# Patient Record
Sex: MALE | Race: BLACK OR AFRICAN AMERICAN | NOT HISPANIC OR LATINO | URBAN - METROPOLITAN AREA
[De-identification: names, ages, dates, MRNs, and addresses within clinical notes are randomized per-mention and may not be internally consistent; named-entity substitution may affect disease eponyms.]

---

## 2017-04-12 ENCOUNTER — EMERGENCY (EMERGENCY)
Facility: HOSPITAL | Age: 37
LOS: 1 days | Discharge: HOME | End: 2017-04-12
Attending: EMERGENCY MEDICINE | Admitting: EMERGENCY MEDICINE
Payer: COMMERCIAL

## 2017-04-12 VITALS
SYSTOLIC BLOOD PRESSURE: 140 MMHG | DIASTOLIC BLOOD PRESSURE: 79 MMHG | HEART RATE: 80 BPM | OXYGEN SATURATION: 100 % | RESPIRATION RATE: 18 BRPM

## 2017-04-12 VITALS
TEMPERATURE: 98 F | RESPIRATION RATE: 16 BRPM | SYSTOLIC BLOOD PRESSURE: 134 MMHG | DIASTOLIC BLOOD PRESSURE: 68 MMHG | OXYGEN SATURATION: 100 % | HEART RATE: 70 BPM

## 2017-04-12 DIAGNOSIS — S01.81XA LACERATION WITHOUT FOREIGN BODY OF OTHER PART OF HEAD, INITIAL ENCOUNTER: ICD-10-CM

## 2017-04-12 DIAGNOSIS — Z23 ENCOUNTER FOR IMMUNIZATION: ICD-10-CM
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
## 2017-04-12 DIAGNOSIS — S02.80XA FRACTURE OF OTHER SPECIFIED SKULL AND FACIAL BONES, UNSPECIFIED SIDE, INITIAL ENCOUNTER FOR CLOSED FRACTURE: ICD-10-CM

## 2017-04-12 DIAGNOSIS — R51 HEADACHE: ICD-10-CM
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
## 2017-04-12 LAB
ALBUMIN SERPL ELPH-MCNC: 4.6 G/DL — SIGNIFICANT CHANGE UP (ref 3.3–5)
ALP SERPL-CCNC: 51 U/L — SIGNIFICANT CHANGE UP (ref 40–120)
ALT FLD-CCNC: 18 U/L RC — SIGNIFICANT CHANGE UP (ref 10–45)
ANION GAP SERPL CALC-SCNC: 15 MMOL/L — SIGNIFICANT CHANGE UP (ref 5–17)
APTT BLD: 30.5 SEC — SIGNIFICANT CHANGE UP (ref 27.5–37.4)
AST SERPL-CCNC: 21 U/L — SIGNIFICANT CHANGE UP (ref 10–40)
BASOPHILS # BLD AUTO: 0 K/UL — SIGNIFICANT CHANGE UP (ref 0–0.2)
BASOPHILS NFR BLD AUTO: 0.9 % — SIGNIFICANT CHANGE UP (ref 0–2)
BILIRUB SERPL-MCNC: 2 MG/DL — HIGH (ref 0.2–1.2)
BLD GP AB SCN SERPL QL: NEGATIVE — SIGNIFICANT CHANGE UP
BUN SERPL-MCNC: 13 MG/DL — SIGNIFICANT CHANGE UP (ref 7–23)
CALCIUM SERPL-MCNC: 9.2 MG/DL — SIGNIFICANT CHANGE UP (ref 8.4–10.5)
CHLORIDE SERPL-SCNC: 99 MMOL/L — SIGNIFICANT CHANGE UP (ref 96–108)
CK SERPL-CCNC: 150 U/L — SIGNIFICANT CHANGE UP (ref 30–200)
CO2 SERPL-SCNC: 26 MMOL/L — SIGNIFICANT CHANGE UP (ref 22–31)
CREAT SERPL-MCNC: 1 MG/DL — SIGNIFICANT CHANGE UP (ref 0.5–1.3)
EOSINOPHIL # BLD AUTO: 0 K/UL — SIGNIFICANT CHANGE UP (ref 0–0.5)
EOSINOPHIL NFR BLD AUTO: 0.7 % — SIGNIFICANT CHANGE UP (ref 0–6)
ETHANOL SERPL-MCNC: SIGNIFICANT CHANGE UP MG/DL (ref 0–10)
GAS PNL BLDV: SIGNIFICANT CHANGE UP
GLUCOSE SERPL-MCNC: 137 MG/DL — HIGH (ref 70–99)
HCT VFR BLD CALC: 48.7 % — SIGNIFICANT CHANGE UP (ref 39–50)
HGB BLD-MCNC: 16.2 G/DL — SIGNIFICANT CHANGE UP (ref 13–17)
INR BLD: 1.2 RATIO — HIGH (ref 0.88–1.16)
LIDOCAIN IGE QN: 26 U/L — SIGNIFICANT CHANGE UP (ref 7–60)
LYMPHOCYTES # BLD AUTO: 1.6 K/UL — SIGNIFICANT CHANGE UP (ref 1–3.3)
LYMPHOCYTES # BLD AUTO: 29.3 % — SIGNIFICANT CHANGE UP (ref 13–44)
MCHC RBC-ENTMCNC: 32 PG — SIGNIFICANT CHANGE UP (ref 27–34)
MCHC RBC-ENTMCNC: 33.3 GM/DL — SIGNIFICANT CHANGE UP (ref 32–36)
MCV RBC AUTO: 96 FL — SIGNIFICANT CHANGE UP (ref 80–100)
MONOCYTES # BLD AUTO: 0.2 K/UL — SIGNIFICANT CHANGE UP (ref 0–0.9)
MONOCYTES NFR BLD AUTO: 4.2 % — SIGNIFICANT CHANGE UP (ref 2–14)
NEUTROPHILS # BLD AUTO: 3.6 K/UL — SIGNIFICANT CHANGE UP (ref 1.8–7.4)
NEUTROPHILS NFR BLD AUTO: 65 % — SIGNIFICANT CHANGE UP (ref 43–77)
PLATELET # BLD AUTO: 188 K/UL — SIGNIFICANT CHANGE UP (ref 150–400)
POTASSIUM SERPL-MCNC: 3.8 MMOL/L — SIGNIFICANT CHANGE UP (ref 3.5–5.3)
POTASSIUM SERPL-SCNC: 3.8 MMOL/L — SIGNIFICANT CHANGE UP (ref 3.5–5.3)
PROT SERPL-MCNC: 7.3 G/DL — SIGNIFICANT CHANGE UP (ref 6–8.3)
PROTHROM AB SERPL-ACNC: 13.1 SEC — HIGH (ref 9.8–12.7)
RBC # BLD: 5.08 M/UL — SIGNIFICANT CHANGE UP (ref 4.2–5.8)
RBC # FLD: 11 % — SIGNIFICANT CHANGE UP (ref 10.3–14.5)
RH IG SCN BLD-IMP: NEGATIVE — SIGNIFICANT CHANGE UP
RH IG SCN BLD-IMP: POSITIVE — SIGNIFICANT CHANGE UP
RH IG SCN BLD-IMP: POSITIVE — SIGNIFICANT CHANGE UP
SODIUM SERPL-SCNC: 140 MMOL/L — SIGNIFICANT CHANGE UP (ref 135–145)
WBC # BLD: 5.5 K/UL — SIGNIFICANT CHANGE UP (ref 3.8–10.5)
WBC # FLD AUTO: 5.5 K/UL — SIGNIFICANT CHANGE UP (ref 3.8–10.5)

## 2017-04-12 PROCEDURE — 83690 ASSAY OF LIPASE: CPT

## 2017-04-12 PROCEDURE — 90471 IMMUNIZATION ADMIN: CPT

## 2017-04-12 PROCEDURE — 96374 THER/PROPH/DIAG INJ IV PUSH: CPT | Mod: XU

## 2017-04-12 PROCEDURE — 82330 ASSAY OF CALCIUM: CPT

## 2017-04-12 PROCEDURE — 72125 CT NECK SPINE W/O DYE: CPT

## 2017-04-12 PROCEDURE — 84132 ASSAY OF SERUM POTASSIUM: CPT

## 2017-04-12 PROCEDURE — 86901 BLOOD TYPING SEROLOGIC RH(D): CPT

## 2017-04-12 PROCEDURE — 85014 HEMATOCRIT: CPT

## 2017-04-12 PROCEDURE — 70450 CT HEAD/BRAIN W/O DYE: CPT | Mod: 26

## 2017-04-12 PROCEDURE — 82435 ASSAY OF BLOOD CHLORIDE: CPT

## 2017-04-12 PROCEDURE — 70450 CT HEAD/BRAIN W/O DYE: CPT

## 2017-04-12 PROCEDURE — 84295 ASSAY OF SERUM SODIUM: CPT

## 2017-04-12 PROCEDURE — 70486 CT MAXILLOFACIAL W/O DYE: CPT | Mod: 26

## 2017-04-12 PROCEDURE — 85730 THROMBOPLASTIN TIME PARTIAL: CPT

## 2017-04-12 PROCEDURE — 93005 ELECTROCARDIOGRAM TRACING: CPT

## 2017-04-12 PROCEDURE — 71260 CT THORAX DX C+: CPT | Mod: 26

## 2017-04-12 PROCEDURE — 82803 BLOOD GASES ANY COMBINATION: CPT

## 2017-04-12 PROCEDURE — 71010: CPT | Mod: 26

## 2017-04-12 PROCEDURE — 80307 DRUG TEST PRSMV CHEM ANLYZR: CPT

## 2017-04-12 PROCEDURE — 99285 EMERGENCY DEPT VISIT HI MDM: CPT | Mod: 25

## 2017-04-12 PROCEDURE — 74177 CT ABD & PELVIS W/CONTRAST: CPT | Mod: 26

## 2017-04-12 PROCEDURE — 96375 TX/PRO/DX INJ NEW DRUG ADDON: CPT | Mod: XU

## 2017-04-12 PROCEDURE — 74177 CT ABD & PELVIS W/CONTRAST: CPT

## 2017-04-12 PROCEDURE — 71260 CT THORAX DX C+: CPT

## 2017-04-12 PROCEDURE — 70486 CT MAXILLOFACIAL W/O DYE: CPT

## 2017-04-12 PROCEDURE — 71045 X-RAY EXAM CHEST 1 VIEW: CPT

## 2017-04-12 PROCEDURE — 72125 CT NECK SPINE W/O DYE: CPT | Mod: 26

## 2017-04-12 PROCEDURE — 82550 ASSAY OF CK (CPK): CPT

## 2017-04-12 PROCEDURE — 93010 ELECTROCARDIOGRAM REPORT: CPT

## 2017-04-12 PROCEDURE — 90715 TDAP VACCINE 7 YRS/> IM: CPT

## 2017-04-12 PROCEDURE — 82947 ASSAY GLUCOSE BLOOD QUANT: CPT

## 2017-04-12 PROCEDURE — 99284 EMERGENCY DEPT VISIT MOD MDM: CPT | Mod: 25

## 2017-04-12 PROCEDURE — 86850 RBC ANTIBODY SCREEN: CPT

## 2017-04-12 PROCEDURE — 80053 COMPREHEN METABOLIC PANEL: CPT

## 2017-04-12 PROCEDURE — 83605 ASSAY OF LACTIC ACID: CPT

## 2017-04-12 PROCEDURE — 86900 BLOOD TYPING SEROLOGIC ABO: CPT

## 2017-04-12 PROCEDURE — 85027 COMPLETE CBC AUTOMATED: CPT

## 2017-04-12 PROCEDURE — 85610 PROTHROMBIN TIME: CPT

## 2017-04-12 RX ORDER — ACETAMINOPHEN 500 MG
1000 TABLET ORAL ONCE
Qty: 0 | Refills: 0 | Status: COMPLETED | OUTPATIENT
Start: 2017-04-12 | End: 2017-04-12

## 2017-04-12 RX ORDER — TETANUS TOXOID, REDUCED DIPHTHERIA TOXOID AND ACELLULAR PERTUSSIS VACCINE, ADSORBED 5; 2.5; 8; 8; 2.5 [IU]/.5ML; [IU]/.5ML; UG/.5ML; UG/.5ML; UG/.5ML
0.5 SUSPENSION INTRAMUSCULAR ONCE
Qty: 0 | Refills: 0 | Status: COMPLETED | OUTPATIENT
Start: 2017-04-12 | End: 2017-04-12

## 2017-04-12 RX ORDER — SODIUM CHLORIDE 9 MG/ML
1000 INJECTION INTRAMUSCULAR; INTRAVENOUS; SUBCUTANEOUS ONCE
Qty: 0 | Refills: 0 | Status: COMPLETED | OUTPATIENT
Start: 2017-04-12 | End: 2017-04-12

## 2017-04-12 RX ORDER — MORPHINE SULFATE 50 MG/1
4 CAPSULE, EXTENDED RELEASE ORAL ONCE
Qty: 0 | Refills: 0 | Status: DISCONTINUED | OUTPATIENT
Start: 2017-04-12 | End: 2017-04-12

## 2017-04-12 RX ORDER — OXYCODONE HYDROCHLORIDE 5 MG/1
1 TABLET ORAL
Qty: 12 | Refills: 0
Start: 2017-04-12 | End: 2017-04-15

## 2017-04-12 RX ADMIN — Medication 400 MILLIGRAM(S): at 15:48

## 2017-04-12 RX ADMIN — Medication 1 TABLET(S): at 23:31

## 2017-04-12 RX ADMIN — Medication 1000 MILLIGRAM(S): at 16:18

## 2017-04-12 RX ADMIN — MORPHINE SULFATE 4 MILLIGRAM(S): 50 CAPSULE, EXTENDED RELEASE ORAL at 17:29

## 2017-04-12 RX ADMIN — SODIUM CHLORIDE 1000 MILLILITER(S): 9 INJECTION INTRAMUSCULAR; INTRAVENOUS; SUBCUTANEOUS at 15:53

## 2017-04-12 RX ADMIN — TETANUS TOXOID, REDUCED DIPHTHERIA TOXOID AND ACELLULAR PERTUSSIS VACCINE, ADSORBED 0.5 MILLILITER(S): 5; 2.5; 8; 8; 2.5 SUSPENSION INTRAMUSCULAR at 15:51

## 2017-04-12 RX ADMIN — MORPHINE SULFATE 4 MILLIGRAM(S): 50 CAPSULE, EXTENDED RELEASE ORAL at 17:59

## 2017-04-12 NOTE — ED PROVIDER NOTE - ATTENDING CONTRIBUTION TO CARE
I have seen and evaluated this patient with the resident.   I agree with the findings  unless other wise stated.  I have made appropriate changes in documentations where needed, After my face to face bedside evaluation, I am further  noting: fluids, cervical collar in place until cleared radiographically and clinically. Will consult trauma and plastics for facial laceration and other teams as needed when imaging returns. Orbital fracture without entrapment to be followed with trauma service and maxillofacial surgery
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
## 2017-04-12 NOTE — ED ADULT NURSE REASSESSMENT NOTE - NS ED NURSE REASSESS COMMENT FT1
Patient in stretcher resting comfortably.  Patient awaiting MD reevaluation and than possible D/C.  Patient is A&Ox4, neuro intact.

## 2017-04-12 NOTE — ED PROVIDER NOTE - CARE PLAN
Instructions for follow-up, activity and diet:	Follow up with Dr. Wooten tomorrow    [address] [facility] [address] Instructions for follow-up, activity and diet:	Follow up with Dr. Wooten tomorrow    [address]  bacitracin 3x daily Principal Discharge DX:	Orbital fracture  Instructions for follow-up, activity and diet:	Follow up with Dr. Wooten tomorrow    Dominique jordandarci yin luque  [address] [facility] [address]  apply bacitracin antibiotic ointment 3x daily to your sutured wound  Do not blow your nose and use nasal spray if you are congested  Take oxycodone as needed, as directed, for breakthrough pain. DO NOT DRINK OR OPERATE HEAVY MACHINERY UNDER THE INFLUENCE OF OXYCODONE. Additionally, take a stool softener while taking this medication.  You may take 1000mg of tylenol every 6 hours for baseline pain control with respect to the warnings on the label  Follow up with your primary care doctor within 48-72 hours.   You must return for new, worsening or concerning symptoms; specifically including those listed on the attached sheet.  Secondary Diagnosis:	Facial laceration Principal Discharge DX:	Orbital fracture  Instructions for follow-up, activity and diet:	Follow up with Dr. Wooten tomorrow    Dominique jordandarci yin luque  [address] [facility] [address]  apply bacitracin antibiotic ointment 3x daily to your sutured wound  Do not blow your nose and use nasal spray if you are congested  Take oxycodone as needed, as directed, for breakthrough pain. DO NOT DRINK OR OPERATE HEAVY MACHINERY UNDER THE INFLUENCE OF OXYCODONE. Additionally, take a stool softener while taking this medication.  You may take 1000mg of tylenol every 6 hours for baseline pain control with respect to the warnings on the label  Follow up with your primary care doctor within 48-72 hours.   You must return for new, worsening or concerning symptoms; specifically including those listed on the attached sheet.  Secondary Diagnosis:	Facial laceration Principal Discharge DX:	Orbital fracture  Instructions for follow-up, activity and diet:	Follow up with Dr. Wooten tomorrow    Dominique jordandarci yin luque  [address] [facility] [address]  apply bacitracin antibiotic ointment 3x daily to your sutured wound  Do not blow your nose and use nasal spray if you are congested  Take oxycodone as needed, as directed, for breakthrough pain. DO NOT DRINK OR OPERATE HEAVY MACHINERY UNDER THE INFLUENCE OF OXYCODONE. Additionally, take a stool softener while taking this medication.  You may take 1000mg of tylenol every 6 hours for baseline pain control with respect to the warnings on the label  Follow up with your primary care doctor within 48-72 hours.   You must return for new, worsening or concerning symptoms; specifically including those listed on the attached sheet.  Secondary Diagnosis:	Facial laceration Principal Discharge DX:	Orbital fracture  Instructions for follow-up, activity and diet:	Follow up with Dr. Wooten tomorrow    Augmentin 875mg 1 tab by mouth twice a day x 10 days.   Dominique luque  [address]  apply bacitracin antibiotic ointment 3x daily to your sutured wound  Do not blow your nose and use nasal spray if you are congested  Take oxycodone as needed, as directed, for breakthrough pain. DO NOT DRINK OR OPERATE HEAVY MACHINERY UNDER THE INFLUENCE OF OXYCODONE. Additionally, take a stool softener while taking this medication.  You may take 1000mg of tylenol every 6 hours for baseline pain control with respect to the warnings on the label  Follow up with your primary care doctor within 48-72 hours.   You must return for new, worsening or concerning symptoms; specifically including those listed on the attached sheet.  Secondary Diagnosis:	Facial laceration

## 2017-04-12 NOTE — ED PROVIDER NOTE - PLAN OF CARE
Follow up with Dr. Wooten tomorrow    [address] [facility] [address] Follow up with Dr. Wooten tomorrow    [address] [facility] [address]  bacitracin 3x daily Follow up with Dr. Wooten tomorrow    Dominique laurence wuosei rodríguez demwali luque  [address] [facility] [address]  apply bacitracin antibiotic ointment 3x daily to your sutured wound  Do not blow your nose and use nasal spray if you are congested  Take oxycodone as needed, as directed, for breakthrough pain. DO NOT DRINK OR OPERATE HEAVY MACHINERY UNDER THE INFLUENCE OF OXYCODONE. Additionally, take a stool softener while taking this medication.  You may take 1000mg of tylenol every 6 hours for baseline pain control with respect to the warnings on the label  Follow up with your primary care doctor within 48-72 hours.   You must return for new, worsening or concerning symptoms; specifically including those listed on the attached sheet. Follow up with Dr. Wooten tomorrow    Augmentin 875mg 1 tab by mouth twice a day x 10 days.   Dominique luque  [address]  apply bacitracin antibiotic ointment 3x daily to your sutured wound  Do not blow your nose and use nasal spray if you are congested  Take oxycodone as needed, as directed, for breakthrough pain. DO NOT DRINK OR OPERATE HEAVY MACHINERY UNDER THE INFLUENCE OF OXYCODONE. Additionally, take a stool softener while taking this medication.  You may take 1000mg of tylenol every 6 hours for baseline pain control with respect to the warnings on the label  Follow up with your primary care doctor within 48-72 hours.   You must return for new, worsening or concerning symptoms; specifically including those listed on the attached sheet.

## 2017-04-12 NOTE — ED PROVIDER NOTE - PROGRESS NOTE DETAILS
Abelino PGY2: cleared  c collar after ct scan neg, no midline pain, ranged neck well Abelino PGY2: laceration repaired by plastics team Patient cleared by plastics and trauma. Awaiting Optho eval. RG
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
## 2019-08-20 PROBLEM — Z00.00 ENCOUNTER FOR PREVENTIVE HEALTH EXAMINATION: Status: ACTIVE | Noted: 2019-08-20

## 2019-08-27 ENCOUNTER — APPOINTMENT (OUTPATIENT)
Dept: UROLOGY | Facility: CLINIC | Age: 39
End: 2019-08-27

## 2019-08-29 ENCOUNTER — APPOINTMENT (OUTPATIENT)
Dept: UROLOGY | Facility: CLINIC | Age: 39
End: 2019-08-29
Payer: COMMERCIAL

## 2019-08-29 ENCOUNTER — APPOINTMENT (OUTPATIENT)
Dept: UROLOGY | Facility: CLINIC | Age: 39
End: 2019-08-29

## 2019-08-29 ENCOUNTER — OUTPATIENT (OUTPATIENT)
Dept: OUTPATIENT SERVICES | Facility: HOSPITAL | Age: 39
LOS: 1 days | End: 2019-08-29
Payer: COMMERCIAL

## 2019-08-29 VITALS
WEIGHT: 139 LBS | TEMPERATURE: 98.4 F | DIASTOLIC BLOOD PRESSURE: 74 MMHG | RESPIRATION RATE: 17 BRPM | SYSTOLIC BLOOD PRESSURE: 119 MMHG | HEIGHT: 66 IN | HEART RATE: 56 BPM | BODY MASS INDEX: 22.34 KG/M2

## 2019-08-29 DIAGNOSIS — R35.0 FREQUENCY OF MICTURITION: ICD-10-CM

## 2019-08-29 PROCEDURE — 99204 OFFICE O/P NEW MOD 45 MIN: CPT | Mod: 25

## 2019-08-29 PROCEDURE — 76870 US EXAM SCROTUM: CPT

## 2019-08-29 PROCEDURE — 76870 US EXAM SCROTUM: CPT | Mod: 26

## 2019-08-29 PROCEDURE — 93975 VASCULAR STUDY: CPT

## 2019-08-29 PROCEDURE — 93975 VASCULAR STUDY: CPT | Mod: 26

## 2019-08-29 NOTE — HISTORY OF PRESENT ILLNESS
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 I reviewed the questionnaire he had completed with him in detail. Patient had been evaluated in ED and had a varicocele ligation performed. His last semen analysis was 2 years prior and demonstrated azoospermia. His wife, age 38, was able to accompany him to the visit today.  She has not had any prior pregnancies. As I understand her evaluation has been normal to date. They have been trying to achieve pregnancy for the past 10 years without conception.  He has no known drug allergies.  His past medical history demonstrates  no significant urologic issues (see patient questionnaire).  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  He does not smoke and drinks only socially.  He has no known drug allergies.  His review of systems is non-contributory. His family history is not significant.\par \par

## 2019-08-29 NOTE — PHYSICAL EXAM
[General Appearance - Well Developed] : well developed [General Appearance - Well Nourished] : well nourished [Normal Appearance] : normal appearance [Well Groomed] : well groomed [Heart Rate And Rhythm] : Heart rate and rhythm were normal [General Appearance - In No Acute Distress] : no acute distress [Arterial Pulses Normal] : the pedal pulses were normal [Edema] : no peripheral edema [Exaggerated Use Of Accessory Muscles For Inspiration] : no accessory muscle use [Respiration, Rhythm And Depth] : normal respiratory rhythm and effort [Auscultation Breath Sounds / Voice Sounds] : lungs were clear to auscultation bilaterally [Chest Palpation] : palpation of the chest revealed no abnormalities [Lungs Percussion] : the lungs were normal to percussion [Bowel Sounds] : normal bowel sounds [Abdomen Tenderness] : non-tender [Costovertebral Angle Tenderness] : no ~M costovertebral angle tenderness [Abdomen Soft] : soft [Urethral Meatus] : meatus normal [Urinary Bladder Findings] : the bladder was normal on palpation [Scrotum] : the scrotum was normal [Rectal Exam - Seminal Vesicles] : the seminal vesicles were normal [Epididymis] : the epididymides were normal [Testes Tenderness] : no tenderness of the testes [Testes Mass (___cm)] : there were no testicular masses [Anus Abnormality] : the anus and perineum were normal [Prostate Tenderness] : the prostate was not tender [Prostate Enlargement] : the prostate was not enlarged [Rectal Exam - Rectum] : digital rectal exam was normal [No Prostate Nodules] : no prostate nodules [Normal Station and Gait] : the gait and station were normal for the patient's age [Skin Color & Pigmentation] : normal skin color and pigmentation [Skin Turgor] : supple [] : no rash [Skin Lesions] : no skin lesions [No Focal Deficits] : no focal deficits [Sensation] : the sensory exam was normal to light touch and pinprick [Motor Exam] : the motor exam was normal [Affect] : the affect was normal [Oriented To Time, Place, And Person] : oriented to person, place, and time [Not Anxious] : not anxious [Mood] : the mood was normal [Cervical Lymph Nodes Enlarged Posterior Bilaterally] : posterior cervical [No Palpable Adenopathy] : no palpable adenopathy [Supraclavicular Lymph Nodes Enlarged Bilaterally] : supraclavicular [Cervical Lymph Nodes Enlarged Anterior Bilaterally] : anterior cervical [Inguinal Lymph Nodes Enlarged Bilaterally] : inguinal [Axillary Lymph Nodes Enlarged Bilaterally] : axillary [Femoral Lymph Nodes Enlarged Bilaterally] : femoral

## 2019-08-29 NOTE — ASSESSMENT
[FreeTextEntry1] : This pleasant  gentleman presents with primary subfertilty, azoospermia and bilateral varicoceles.  I have requested several baseline blood studies including genetic studies, a semen analysis and additional imaging.  Urine dip and microscopic analysis was requested.  I will make more specific recommendations after the results of the requested tests return.\par 1. Review blood studies and semen analysis on follow up\par 2. Discuss options for therapy on follow up\par

## 2019-08-31 LAB
25(OH)D3 SERPL-MCNC: 15.6 NG/ML
ALBUMIN SERPL ELPH-MCNC: 5 G/DL
ALP BLD-CCNC: 53 U/L
ALT SERPL-CCNC: 28 U/L
ANION GAP SERPL CALC-SCNC: 13 MMOL/L
APPEARANCE: CLEAR
AST SERPL-CCNC: 20 U/L
BASOPHILS # BLD AUTO: 0.02 K/UL
BASOPHILS NFR BLD AUTO: 0.6 %
BILIRUB SERPL-MCNC: 1.8 MG/DL
BILIRUBIN URINE: NEGATIVE
BLOOD URINE: NEGATIVE
BUN SERPL-MCNC: 12 MG/DL
CALCIUM SERPL-MCNC: 9.3 MG/DL
CHLORIDE SERPL-SCNC: 100 MMOL/L
CHOLEST SERPL-MCNC: 127 MG/DL
CHOLEST/HDLC SERPL: 3 RATIO
CO2 SERPL-SCNC: 28 MMOL/L
COLOR: NORMAL
CREAT SERPL-MCNC: 0.99 MG/DL
EOSINOPHIL # BLD AUTO: 0.03 K/UL
EOSINOPHIL NFR BLD AUTO: 0.9 %
ESTIMATED AVERAGE GLUCOSE: 94 MG/DL
ESTRADIOL SERPL-MCNC: 35 PG/ML
FSH SERPL-MCNC: 2 IU/L
GLUCOSE QUALITATIVE U: NEGATIVE
GLUCOSE SERPL-MCNC: 85 MG/DL
HBA1C MFR BLD HPLC: 4.9 %
HCT VFR BLD CALC: 51.3 %
HDLC SERPL-MCNC: 43 MG/DL
HGB BLD-MCNC: 16.3 G/DL
IMM GRANULOCYTES NFR BLD AUTO: 0.6 %
KETONES URINE: NEGATIVE
LDLC SERPL CALC-MCNC: 69 MG/DL
LEUKOCYTE ESTERASE URINE: NEGATIVE
LH SERPL-ACNC: 5 IU/L
LYMPHOCYTES # BLD AUTO: 1.44 K/UL
LYMPHOCYTES NFR BLD AUTO: 40.9 %
MAN DIFF?: NORMAL
MCHC RBC-ENTMCNC: 31.8 GM/DL
MCHC RBC-ENTMCNC: 32 PG
MCV RBC AUTO: 100.6 FL
MONOCYTES # BLD AUTO: 0.28 K/UL
MONOCYTES NFR BLD AUTO: 8 %
NEUTROPHILS # BLD AUTO: 1.73 K/UL
NEUTROPHILS NFR BLD AUTO: 49 %
NITRITE URINE: NEGATIVE
PH URINE: 7.5
PLATELET # BLD AUTO: 185 K/UL
POTASSIUM SERPL-SCNC: 4.4 MMOL/L
PROLACTIN SERPL-MCNC: 5.8 NG/ML
PROT SERPL-MCNC: 7.3 G/DL
PROTEIN URINE: NEGATIVE
PSA SERPL-MCNC: 1.19 NG/ML
RBC # BLD: 5.1 M/UL
RBC # FLD: 11.7 %
SODIUM SERPL-SCNC: 141 MMOL/L
SPECIFIC GRAVITY URINE: 1.02
TRIGL SERPL-MCNC: 76 MG/DL
TSH SERPL-ACNC: 2.42 UIU/ML
UROBILINOGEN URINE: NORMAL
WBC # FLD AUTO: 3.52 K/UL

## 2019-09-03 DIAGNOSIS — N46.01 ORGANIC AZOOSPERMIA: ICD-10-CM

## 2019-09-03 LAB — Y CHROMOSOME MICRODELETION, DNA ANALYSIS: NORMAL

## 2019-09-04 LAB
TESTOST BND SERPL-MCNC: 5.2 PG/ML
TESTOST SERPL-MCNC: 700.6 NG/DL

## 2019-09-09 LAB — CFTR MUT TESTED BLD/T: NEGATIVE

## 2019-09-19 ENCOUNTER — APPOINTMENT (OUTPATIENT)
Dept: UROLOGY | Facility: CLINIC | Age: 39
End: 2019-09-19

## 2019-10-08 ENCOUNTER — APPOINTMENT (OUTPATIENT)
Dept: UROLOGY | Facility: CLINIC | Age: 39
End: 2019-10-08

## 2020-01-16 ENCOUNTER — APPOINTMENT (OUTPATIENT)
Dept: UROLOGY | Facility: CLINIC | Age: 40
End: 2020-01-16

## 2020-01-16 ENCOUNTER — APPOINTMENT (OUTPATIENT)
Dept: UROLOGY | Facility: CLINIC | Age: 40
End: 2020-01-16
Payer: COMMERCIAL

## 2020-01-16 PROCEDURE — 99214 OFFICE O/P EST MOD 30 MIN: CPT

## 2020-01-16 NOTE — ASSESSMENT
[FreeTextEntry1] : The patient returns for followup to review his recent semen analysis, blood studies and to discuss options for therapy. Semen analysis was not done. Blood test demonstrated normal hormonal profile and normal genetics. He had not yet obtained a repeat semen analysis. Nonetheless, given his prior azoospermia I have discussed with him testicular biopsy for diagnosis, sperm retrieval and cryopreservation.

## 2020-01-16 NOTE — HISTORY OF PRESENT ILLNESS
[FreeTextEntry1] : The patient returns for followup to review his recent semen analysis, blood studies and to discuss options for therapy. 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  Patient had been evaluated in ED and had a varicocele ligation performed. His last semen analysis was 2 years prior and demonstrated azoospermia. His wife, age 38, was able to accompany him to the visit today.  She has not had any prior pregnancies. As I understand her evaluation has been normal to date. They have been trying to achieve pregnancy for the past 10 years without conception.  He has no known drug allergies.  His past medical history demonstrates  no significant urologic issues (see patient questionnaire).  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  He does not smoke and drinks only socially.  He has no known drug allergies.  His review of systems is non-contributory. His family history is not significant.\par \par

## 2020-02-24 ENCOUNTER — OUTPATIENT (OUTPATIENT)
Dept: OUTPATIENT SERVICES | Facility: HOSPITAL | Age: 40
LOS: 1 days | End: 2020-02-24
Payer: COMMERCIAL

## 2020-02-24 ENCOUNTER — APPOINTMENT (OUTPATIENT)
Dept: RADIOLOGY | Facility: HOSPITAL | Age: 40
End: 2020-02-24

## 2020-02-24 DIAGNOSIS — R76.11 NONSPECIFIC REACTION TO TUBERCULIN SKIN TEST WITHOUT ACTIVE TUBERCULOSIS: ICD-10-CM

## 2020-02-24 PROCEDURE — 71046 X-RAY EXAM CHEST 2 VIEWS: CPT | Mod: 26

## 2020-09-24 ENCOUNTER — APPOINTMENT (OUTPATIENT)
Dept: UROLOGY | Facility: CLINIC | Age: 40
End: 2020-09-24
Payer: COMMERCIAL

## 2020-09-24 VITALS — TEMPERATURE: 97.9 F

## 2020-09-24 PROCEDURE — 99214 OFFICE O/P EST MOD 30 MIN: CPT

## 2020-09-24 NOTE — HISTORY OF PRESENT ILLNESS
[FreeTextEntry1] : The patient returns with his wife for followup to review his recent semen analysis, blood studies and to discuss options for therapy. 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  Patient had been evaluated in ED and had a varicocele ligation performed. His last semen analysis was 2 years prior and demonstrated azoospermia. His wife, age 38, was able to accompany him to the visit today.  She has not had any prior pregnancies. As I understand her evaluation has been normal to date. They have been trying to achieve pregnancy for the past 10 years without conception.  He has no known drug allergies.  His past medical history demonstrates  no significant urologic issues (see patient questionnaire).  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  He does not smoke and drinks only socially.  He has no known drug allergies.  His review of systems is non-contributory. His family history is not significant.\par \par

## 2020-09-24 NOTE — ASSESSMENT
[FreeTextEntry1] : The patient returns to review his recent blood studies and to discuss options for therapy.  Blood studies were essentially normal.  His genetic studies including karyotype, micro-Y deletion analysis and CF testing were all normal.  Scrotal ultrasound demonstrated enlarged epididymis bilaterally.  He had had a hydrocelectomy in the past and possibly some trauma.  His wife is 38 years of age.  We discussed several options including the possibility of reconstruction however given his wife's aids the most direct and probably best way to proceed is a bilateral testis biopsy for diagnosis, sperm retrieval and cryopreservation.  I have also given his [place] to reproductive endocrinologist.  I will be setting up the procedure for a bilateral microsurgical testicular sperm retrieval and cryopreservation.  I will speak with him in 2 weeks by telehealth to review his wife's evaluation and the procedure again.\par \par Consultation: 30 minutes:  10 minutes reviewing his history and performing a physical examination.  20 minutes reviewing his prior ultrasound, writing for surgical scheduling,discussing treatment options and writing his note. There was also additional time in preparation for today's visit.\par

## 2020-10-12 ENCOUNTER — APPOINTMENT (OUTPATIENT)
Dept: UROLOGY | Facility: CLINIC | Age: 40
End: 2020-10-12
Payer: COMMERCIAL

## 2020-10-12 PROCEDURE — 99214 OFFICE O/P EST MOD 30 MIN: CPT | Mod: 95

## 2020-10-12 NOTE — ASSESSMENT
[FreeTextEntry1] : The patient returns with his wife for a telehealth consultation to review his blood studies, semen analysis and to discuss options for therapy.  His wife has not yet been evaluated because her insurance was not taken by the facility she called.  She is attempting to get other insurance.  Blood studies were essentially normal.  His genetic studies including karyotype, micro-Y deletion analysis and CF testing were all normal.  Scrotal ultrasound demonstrated enlarged epididymis bilaterally.  He had had a hydrocelectomy in the past and possibly some trauma.  His wife is 38 years of age.  We again discussed several options including the possibility of reconstruction however given his wife's aids the most direct and probably best way to proceed is a bilateral testis biopsy for diagnosis, sperm retrieval and cryopreservation.  There appears to be some issue with his insurance also for the procedure.  They are working on getting a new insurance and they will call me back when they are ready to proceed.  Very they have my cell phone number and can call me at anytime with questions they may have.\par \par Telehealth Consultation: 30 minutes  10 minutes reviewing his history and discussing prior results.  20 minutes discussing various treatment options and writing his note. There was also additional time in preparing for the visit and assisting the patient with technology issues he was having with the telehealth platform.

## 2020-10-12 NOTE — HISTORY OF PRESENT ILLNESS
[Home] : at home, [unfilled] , at the time of the visit. [Medical Office: ([facility])___] : at the medical office located in  [Verbal consent obtained from patient] : the patient, [unfilled] [FreeTextEntry1] : The patient-doctor. relationship has been established in a face-to-face fashion on real-time video audio HIPAA compliant communication using telemedicine software.  The patient's identity has been confirmed.  The patient was previously emailed a copy of the telemedicine consent.  The patient has had a chance to review and has now given verbal consent and has requested care to be assessed and treated through telemedicine. The patient understands there may be limitations in this process and that they need not need further follow-up care in the office and/or hospital settings. We were unable to use the [employer] platform and an alternative platform was utilized.\par \par Verbal consent was given on Monday, October 12, 2020 at 2:40 PM by the patient.  It was requested by the physician.  A written consent was previously sent for the patient to sign and return.\par \par The patient returns with his wife for a telehealth consultation to review his blood studies, semen analysis and to discuss options for therapy.  His wife has not yet been evaluated because her insurance was not taken by the facility she called.  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  Patient had been evaluated in ED and had a varicocele ligation performed. His last semen analysis was 2 years prior and demonstrated azoospermia. His wife, age 38, was able to accompany him to the visit today.  She has not had any prior pregnancies. As I understand her evaluation has been normal to date. They have been trying to achieve pregnancy for the past 10 years without conception.  He has no known drug allergies.  His past medical history demonstrates  no significant urologic issues (see patient questionnaire).  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  He does not smoke and drinks only socially.  He has no known drug allergies.  His review of systems is non-contributory. His family history is not significant.\par \par

## 2021-04-22 ENCOUNTER — APPOINTMENT (OUTPATIENT)
Dept: UROLOGY | Facility: CLINIC | Age: 41
End: 2021-04-22

## 2021-04-28 ENCOUNTER — APPOINTMENT (OUTPATIENT)
Dept: UROLOGY | Facility: CLINIC | Age: 41
End: 2021-04-28
Payer: COMMERCIAL

## 2021-04-28 PROCEDURE — 99214 OFFICE O/P EST MOD 30 MIN: CPT | Mod: 95

## 2021-04-28 NOTE — HISTORY OF PRESENT ILLNESS
[Home] : at home, [unfilled] , at the time of the visit. [Medical Office: ([facility])___] : at the medical office located in  [Verbal consent obtained from patient] : the patient, [unfilled] [FreeTextEntry1] : The patient-doctor. relationship has been established in a face-to-face fashion on real-time video audio HIPAA compliant communication using telemedicine software.  The patient's identity has been confirmed.  The patient was previously emailed a copy of the telemedicine consent.  The patient has had a chance to review and has now given verbal consent and has requested care to be assessed and treated through telemedicine. The patient understands there may be limitations in this process and that they need not need further follow-up care in the office and/or hospital settings. We were unable to use the [employer] platform and an alternative platform was utilized.\par \par Verbal consent was given on Monday, October 12, 2020 at 2:40 PM by the patient.  It was requested by the physician.  A written consent was previously sent for the patient to sign and return.\par \par The patient returns with his wife for a telehealth consultation.  His wife has been recently evaluated and found to be normal.  He did not know the name of the person that we send me the records.  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  Patient had been evaluated in ED and had a varicocele ligation performed. His last semen analysis was 2 years prior and demonstrated azoospermia. His wife, age 38, was able to accompany him to the visit today.  She has not had any prior pregnancies. As I understand her evaluation has been normal to date. They have been trying to achieve pregnancy for the past 10 years without conception.  He has no known drug allergies.  His past medical history demonstrates  no significant urologic issues (see patient questionnaire).  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  He does not smoke and drinks only socially.  He has no known drug allergies.  His review of systems is non-contributory. His family history is not significant.\par \par

## 2021-04-28 NOTE — ASSESSMENT
[FreeTextEntry1] : The patient returns with his wife for a telehealth consultation.  His wife has been recently evaluated and found to be normal.  He did not know the name of the person that we send me the records.  He has also been in touch with his team to schedule a TESE. \par \par Prior blood studies were essentially normal.  His genetic studies including karyotype, micro-Y deletion analysis and CF testing were all normal.  Scrotal ultrasound demonstrated enlarged epididymis bilaterally.  He had had a hydrocelectomy in the past and possibly some trauma.  His wife is 38 years of age.  We again discussed several options including the possibility of reconstruction however given his wife's aids the most direct and probably best way to proceed is a bilateral testis biopsy for diagnosis, sperm retrieval and cryopreservation.  He would like to go forward with the procedure.\par \par Telehealth Consultation: 30 minutes  10 minutes reviewing his history and discussing prior results.  20 minutes discussing various treatment options and writing his note. There was also additional time in preparing for the visit and assisting the patient with technology issues he was having with the telehealth platform.

## 2021-05-12 ENCOUNTER — APPOINTMENT (OUTPATIENT)
Dept: UROLOGY | Facility: CLINIC | Age: 41
End: 2021-05-12
Payer: COMMERCIAL

## 2021-05-12 PROCEDURE — 99214 OFFICE O/P EST MOD 30 MIN: CPT | Mod: 95

## 2021-05-12 NOTE — HISTORY OF PRESENT ILLNESS
[Home] : at home, [unfilled] , at the time of the visit. [Medical Office: ([facility])___] : at the medical office located in  [Verbal consent obtained from patient] : the patient, [unfilled] [Spouse] : spouse [FreeTextEntry1] : The patient-doctor. relationship has been established in a face-to-face fashion on real-time video audio HIPAA compliant communication using telemedicine software.  The patient's identity has been confirmed.  The patient was previously emailed a copy of the telemedicine consent.  The patient has had a chance to review and has now given verbal consent and has requested care to be assessed and treated through telemedicine. The patient understands there may be limitations in this process and that they need not need further follow-up care in the office and/or hospital settings. We were unable to use the [employer] platform and an alternative platform was utilized.\par \par Verbal consent was given on Monday, October 12, 2020 at 2:40 PM by the patient.  It was requested by the physician.  A written consent was previously sent for the patient to sign and return.\par \par The patient returns with his wife for a telehealth consultation.  His wife has been recently evaluated and found to be normal.  His wife's records have not yet been received.  I had asked her to get in touch with my  to help arrange that..  He has also been in touch with my team to schedule a TESE.\par \par PMH: Patient initially presented with the chief complaint of azoospermia for the past 10 years for evaluation.  Patient had been evaluated in ED and had a varicocele ligation performed. His last semen analysis was 2 years prior and demonstrated azoospermia. His wife, age 38, was able to accompany him to the visit today.  She has not had any prior pregnancies. As I understand her evaluation has been normal to date. They have been trying to achieve pregnancy for the past 10 years without conception.  He has no known drug allergies.  His past medical history demonstrates  no significant urologic issues (see patient questionnaire).  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  He does not smoke and drinks only socially.  He has no known drug allergies.  His review of systems is non-contributory. His family history is not significant.\par \par

## 2021-05-12 NOTE — ASSESSMENT
[FreeTextEntry1] : The patient returns with his wife for a telehealth consultation.  His wife has been recently evaluated and found to be normal.  His wife's records have not yet been received.  I had asked her to get in touch with my  to help arrange that..  He has also been in touch with my team to schedule a TESE.\par \par Prior blood studies were essentially normal.  His genetic studies including karyotype, micro-Y deletion analysis and CF testing were all normal.  Scrotal ultrasound demonstrated enlarged epididymis bilaterally.  He had had a hydrocelectomy in the past and possibly some trauma.  His wife is 38 years of age.  We again discussed several options including the possibility of reconstruction however given his wife's aids the most direct and probably best way to proceed is a bilateral testis biopsy for diagnosis, sperm retrieval and cryopreservation.  He would like to go forward with the procedure.\par \par Telehealth Consultation: 30 minutes  10 minutes reviewing his history and discussing prior results.  20 minutes discussing various treatment options and writing his note. There was also additional time in preparing for the visit and assisting the patient with technology issues he was having with the telehealth platform.

## 2021-08-05 ENCOUNTER — OUTPATIENT (OUTPATIENT)
Dept: OUTPATIENT SERVICES | Facility: HOSPITAL | Age: 41
LOS: 1 days | End: 2021-08-05

## 2021-08-05 VITALS
WEIGHT: 151.9 LBS | RESPIRATION RATE: 14 BRPM | HEIGHT: 68 IN | DIASTOLIC BLOOD PRESSURE: 80 MMHG | HEART RATE: 64 BPM | TEMPERATURE: 98 F | OXYGEN SATURATION: 97 % | SYSTOLIC BLOOD PRESSURE: 120 MMHG

## 2021-08-05 DIAGNOSIS — Z01.812 ENCOUNTER FOR PREPROCEDURAL LABORATORY EXAMINATION: ICD-10-CM

## 2021-08-05 DIAGNOSIS — N46.01 ORGANIC AZOOSPERMIA: ICD-10-CM

## 2021-08-05 LAB
HCT VFR BLD CALC: 43.4 % — SIGNIFICANT CHANGE UP (ref 39–50)
HGB BLD-MCNC: 14.7 G/DL — SIGNIFICANT CHANGE UP (ref 13–17)
MCHC RBC-ENTMCNC: 32.2 PG — SIGNIFICANT CHANGE UP (ref 27–34)
MCHC RBC-ENTMCNC: 33.9 GM/DL — SIGNIFICANT CHANGE UP (ref 32–36)
MCV RBC AUTO: 95 FL — SIGNIFICANT CHANGE UP (ref 80–100)
NRBC # BLD: 0 /100 WBCS — SIGNIFICANT CHANGE UP
NRBC # FLD: 0 K/UL — SIGNIFICANT CHANGE UP
PLATELET # BLD AUTO: 183 K/UL — SIGNIFICANT CHANGE UP (ref 150–400)
RBC # BLD: 4.57 M/UL — SIGNIFICANT CHANGE UP (ref 4.2–5.8)
RBC # FLD: 11.3 % — SIGNIFICANT CHANGE UP (ref 10.3–14.5)
WBC # BLD: 4.81 K/UL — SIGNIFICANT CHANGE UP (ref 3.8–10.5)
WBC # FLD AUTO: 4.81 K/UL — SIGNIFICANT CHANGE UP (ref 3.8–10.5)

## 2021-08-05 NOTE — H&P PST ADULT - NSICDXPROBLEM_GEN_ALL_CORE_FT
PROBLEM DIAGNOSES  Problem: Organic azoospermia  Assessment and Plan: scheduled b/l testis biopsy for diagnosis, sperm retrieval and cryopreservation on 8/16/2021  Written & verbal preop instructions, gi prophylaxis   Pt verbalized good understanding.        Problem: Encounter for preprocedure screening laboratory testing for COVID-19  Assessment and Plan: pt instructed to contact surgeon office to have covid testing within 72hrs of this surgery

## 2021-08-05 NOTE — H&P PST ADULT - NSANTHOSAYNRD_GEN_A_CORE
No. KANDI screening performed.  STOP BANG Legend: 0-2 = LOW Risk; 3-4 = INTERMEDIATE Risk; 5-8 = HIGH Risk

## 2021-08-05 NOTE — H&P PST ADULT - HISTORY OF PRESENT ILLNESS
42y/o male presents for preop eval for scheduled b/l testis biopsy for diagnosis, sperm retrieval and cryopreservation.  Preop dx organic azoospermia.  Pt states trying to have a child.  40y/o male presents for preop eval for scheduled b/l testis biopsy for diagnosis, sperm retrieval and cryopreservation.  Preop dx organic azoospermia.  Pt states  "trying to have a child."

## 2021-08-10 PROBLEM — N46.01 ORGANIC AZOOSPERMIA: Chronic | Status: ACTIVE | Noted: 2021-08-05

## 2021-08-12 DIAGNOSIS — Z01.818 ENCOUNTER FOR OTHER PREPROCEDURAL EXAMINATION: ICD-10-CM

## 2021-08-13 ENCOUNTER — APPOINTMENT (OUTPATIENT)
Dept: DISASTER EMERGENCY | Facility: CLINIC | Age: 41
End: 2021-08-13

## 2021-08-13 VITALS
HEART RATE: 64 BPM | OXYGEN SATURATION: 97 % | WEIGHT: 151.9 LBS | SYSTOLIC BLOOD PRESSURE: 120 MMHG | RESPIRATION RATE: 14 BRPM | HEIGHT: 68 IN | DIASTOLIC BLOOD PRESSURE: 80 MMHG | TEMPERATURE: 98 F

## 2021-08-13 PROBLEM — N46.01 AZOOSPERMIA: Status: ACTIVE | Noted: 2019-08-29

## 2021-08-13 NOTE — ASSESSMENT
[FreeTextEntry1] : The patient presents for a bilateral testis biopsy for diagnosis, sperm retrieval and cryopreservation. He has normal LH, FSH and testosterone. His genetic testing was also normal.   He has azoospermia.  His wife has been recently evaluated and found to be normal. .\par \par Prior blood studies were essentially normal.  His genetic studies including karyotype, micro-Y deletion analysis and CF testing were all normal.  Scrotal ultrasound demonstrated enlarged epididymis bilaterally.  He had had a hydrocelectomy in the past and possibly some trauma.  His wife is 38 years of age.  We again discussed several options including the possibility of reconstruction however given his wife's aids the most direct and probably best way to proceed is a bilateral testis biopsy for diagnosis, sperm retrieval and cryopreservation.  We discussed the relative risks and benefits including the possibility that sperm may not be retrieved in any sperm retrieved might not be effective in an IVF procedure.  He would like to go forward with the procedure. \par \par

## 2021-08-13 NOTE — HISTORY OF PRESENT ILLNESS
[FreeTextEntry1] : The patient presents for a bilateral testis biopsy for diagnosis, sperm retrieval and cryopreservation. He has normal LH, FSH and testosterone. His genetic testing was also normal.   He has azoospermia.  His wife has been recently evaluated and found to be normal. \par \par PMH: Patient initially presented with the chief complaint of azoospermia for the past 10 years for evaluation.  Patient had been evaluated in ED and had a varicocele ligation performed. His last semen analysis was 2 years prior and demonstrated azoospermia. His wife is age 3y.  She has not had any prior pregnancies. As I understand her evaluation has been normal to date. They have been trying to achieve pregnancy for the past 10 years without conception.  He has no known drug allergies.  His past medical history demonstrates  no significant urologic issues (see patient questionnaire).  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  He does not smoke and drinks only socially.  He has no known drug allergies.  His review of systems is non-contributory. His family history is not significant.\par \par

## 2021-08-13 NOTE — PHYSICAL EXAM
[General Appearance - Well Developed] : well developed [General Appearance - Well Nourished] : well nourished [Normal Appearance] : normal appearance [Well Groomed] : well groomed [General Appearance - In No Acute Distress] : no acute distress [Abdomen Soft] : soft [Urethral Meatus] : meatus normal [Urinary Bladder Findings] : the bladder was normal on palpation [Scrotum] : the scrotum was normal [Testes Mass (___cm)] : there were no testicular masses [Skin Turgor] : supple [Oriented To Time, Place, And Person] : oriented to person, place, and time [Affect] : the affect was normal [Mood] : the mood was normal [Not Anxious] : not anxious [Normal Station and Gait] : the gait and station were normal for the patient's age [No Focal Deficits] : no focal deficits [No Palpable Adenopathy] : no palpable adenopathy

## 2021-08-14 LAB — SARS-COV-2 N GENE NPH QL NAA+PROBE: NOT DETECTED

## 2021-08-15 ENCOUNTER — TRANSCRIPTION ENCOUNTER (OUTPATIENT)
Age: 41
End: 2021-08-15

## 2021-08-16 ENCOUNTER — OUTPATIENT (OUTPATIENT)
Dept: OUTPATIENT SERVICES | Facility: HOSPITAL | Age: 41
LOS: 1 days | Discharge: HOME | End: 2021-08-16
Payer: MEDICAID

## 2021-08-16 ENCOUNTER — RESULT REVIEW (OUTPATIENT)
Age: 41
End: 2021-08-16

## 2021-08-16 ENCOUNTER — APPOINTMENT (OUTPATIENT)
Dept: UROLOGY | Facility: AMBULATORY SURGERY CENTER | Age: 41
End: 2021-08-16

## 2021-08-16 VITALS
SYSTOLIC BLOOD PRESSURE: 110 MMHG | TEMPERATURE: 98 F | DIASTOLIC BLOOD PRESSURE: 70 MMHG | HEART RATE: 66 BPM | OXYGEN SATURATION: 99 % | RESPIRATION RATE: 15 BRPM

## 2021-08-16 DIAGNOSIS — N46.01 ORGANIC AZOOSPERMIA: ICD-10-CM

## 2021-08-16 PROCEDURE — 54505 BIOPSY OF TESTIS: CPT | Mod: 50

## 2021-08-16 PROCEDURE — 88307 TISSUE EXAM BY PATHOLOGIST: CPT | Mod: 26

## 2021-08-16 RX ORDER — UBIDECARENONE 100 MG
1 CAPSULE ORAL
Qty: 0 | Refills: 0 | DISCHARGE

## 2021-08-16 RX ORDER — KETOROLAC TROMETHAMINE 30 MG/ML
1 SYRINGE (ML) INJECTION
Qty: 12 | Refills: 0
Start: 2021-08-16 | End: 2021-08-18

## 2021-08-16 NOTE — ASU DISCHARGE PLAN (ADULT/PEDIATRIC) - CARE PROVIDER_API CALL
Aldo Monaco)  Urology  [address] [place] [address], [address] [id number]  Phone: [phone]  Fax: [phone]  Follow Up Time: 2 weeks

## 2021-08-16 NOTE — ASU DISCHARGE PLAN (ADULT/PEDIATRIC) - CALL YOUR DOCTOR IF YOU HAVE ANY OF THE FOLLOWING:
Bleeding that does not stop/Swelling that gets worse/Pain not relieved by Medications/Fever greater than (need to indicate Fahrenheit or Celsius)/Unable to urinate

## 2021-08-16 NOTE — ASU DISCHARGE PLAN (ADULT/PEDIATRIC) - ASU DC SPECIAL INSTRUCTIONSFT
Testis Biopsy  - small amount of bright red blood is expected. Do not be alarmed.  - At time of discharge you will be given prescription for pain medication.   - Do not drive the first day after surgery   - You may shower 48 hours after the surgery. Keep dressing dry until then.   - Swelling and black and blue are normal  - If your job involves only desk work and very light activity, you may return 2 or 3 days after surgery.   - No heavy work or sports allowed for one week  - No sexual intercourse for one week  - You will be more comfortable if you wear an athletic supporter (jock strap) for one week  - You may resume normal activities as you feel up to it   - There are no stitches that need to be removed  - If you experience any other problems or have any questions please feel free to call the office    Your biopsy results will be reviewed with you on your first post operative visit two weeks after surgery.

## 2021-08-19 LAB — SURGICAL PATHOLOGY STUDY: SIGNIFICANT CHANGE UP

## 2021-09-10 ENCOUNTER — NON-APPOINTMENT (OUTPATIENT)
Age: 41
End: 2021-09-10

## 2023-12-21 ENCOUNTER — APPOINTMENT (OUTPATIENT)
Dept: PULMONOLOGY | Facility: CLINIC | Age: 43
End: 2023-12-21

## 2024-02-14 NOTE — ED PROVIDER NOTE - HISTORY ATTESTATION, MLM
Cardiac Cath Brief Op Note    Preoperative Diagnosis: Left atrial myxoma    Postoperative Diagnosis: Normal coronary arteries    Procedure:  Coronary angiogram  Left heart catheterization  Radial access    Complications: None      Key Findings:   Normal coronary arteries follow-up with vascular surgery.    Final report is in cardiology and imaging section.  John Abad MD   Cardiology & Interventional cardiology   Peripheral Arterial & Venous Disease   Director, [facility] Cardiac Cath Lab  [facility] [place]  [facility]  2/14/2024        I have reviewed and confirmed nurses' notes...

## 2024-08-22 ENCOUNTER — APPOINTMENT (OUTPATIENT)
Dept: HUMAN REPRODUCTION | Facility: CLINIC | Age: 44
End: 2024-08-22

## 2024-08-27 NOTE — ASU PREOP CHECKLIST - SKIN PREP
Hide Include Location In Plan Question?: No
Include Location In Plan?: Yes
Detail Level: Detailed
n/a